# Patient Record
Sex: FEMALE | Race: WHITE | NOT HISPANIC OR LATINO
[De-identification: names, ages, dates, MRNs, and addresses within clinical notes are randomized per-mention and may not be internally consistent; named-entity substitution may affect disease eponyms.]

---

## 2017-07-05 ENCOUNTER — APPOINTMENT (OUTPATIENT)
Dept: GASTROENTEROLOGY | Facility: CLINIC | Age: 67
End: 2017-07-05

## 2017-07-05 VITALS
SYSTOLIC BLOOD PRESSURE: 138 MMHG | TEMPERATURE: 99.2 F | OXYGEN SATURATION: 98 % | BODY MASS INDEX: 22.5 KG/M2 | HEART RATE: 71 BPM | WEIGHT: 127 LBS | HEIGHT: 63 IN | DIASTOLIC BLOOD PRESSURE: 74 MMHG

## 2017-07-05 DIAGNOSIS — I50.9 HEART FAILURE, UNSPECIFIED: ICD-10-CM

## 2017-07-05 DIAGNOSIS — Z78.9 OTHER SPECIFIED HEALTH STATUS: ICD-10-CM

## 2017-07-05 DIAGNOSIS — K57.92 DIVERTICULITIS OF INTESTINE, PART UNSPECIFIED, W/OUT PERFORATION OR ABSCESS W/OUT BLEEDING: ICD-10-CM

## 2017-07-05 RX ORDER — CIPROFLOXACIN HYDROCHLORIDE 500 MG/1
500 TABLET, FILM COATED ORAL
Qty: 20 | Refills: 0 | Status: ACTIVE | COMMUNITY
Start: 2017-06-09

## 2017-07-05 RX ORDER — LOTEPREDNOL ETABONATE 5 MG/ML
0.5 SUSPENSION/ DROPS OPHTHALMIC
Qty: 5 | Refills: 0 | Status: ACTIVE | COMMUNITY
Start: 2017-02-23

## 2017-07-05 RX ORDER — TRIAMCINOLONE ACETONIDE 0.25 MG/G
0.03 CREAM TOPICAL
Qty: 80 | Refills: 0 | Status: ACTIVE | COMMUNITY
Start: 2017-04-20

## 2017-07-05 RX ORDER — LOTEPREDNOL ETABONATE 5 MG/G
0.5 OINTMENT OPHTHALMIC
Qty: 4 | Refills: 0 | Status: ACTIVE | COMMUNITY
Start: 2017-04-28

## 2017-07-05 RX ORDER — CYCLOSPORINE 0.5 MG/ML
0.05 EMULSION OPHTHALMIC
Qty: 6 | Refills: 0 | Status: ACTIVE | COMMUNITY
Start: 2017-03-30

## 2017-07-05 RX ORDER — ERYTHROMYCIN 5 MG/G
5 OINTMENT OPHTHALMIC
Qty: 4 | Refills: 0 | Status: ACTIVE | COMMUNITY
Start: 2016-01-26

## 2017-07-05 RX ORDER — LOTEPREDNOL ETABONATE 5 MG/G
0.5 GEL OPHTHALMIC
Qty: 5 | Refills: 0 | Status: ACTIVE | COMMUNITY
Start: 2017-03-06

## 2017-07-05 RX ORDER — KETOCONAZOLE 20 MG/G
2 CREAM TOPICAL
Qty: 60 | Refills: 0 | Status: ACTIVE | COMMUNITY
Start: 2016-02-23

## 2017-07-05 RX ORDER — HYOSCYAMINE SULFATE 0.12 MG/1
0.12 TABLET ORAL
Qty: 10 | Refills: 0 | Status: ACTIVE | COMMUNITY
Start: 2017-06-09

## 2017-07-05 RX ORDER — BROMFENAC SODIUM 0.7 MG/ML
0.07 SOLUTION/ DROPS OPHTHALMIC
Qty: 3 | Refills: 0 | Status: ACTIVE | COMMUNITY
Start: 2016-04-21

## 2017-07-05 RX ORDER — NYSTATIN 100000 [USP'U]/G
100000 CREAM TOPICAL
Qty: 30 | Refills: 0 | Status: ACTIVE | COMMUNITY
Start: 2017-04-20

## 2017-07-05 RX ORDER — TACROLIMUS 0.3 MG/G
0.03 OINTMENT TOPICAL
Qty: 30 | Refills: 0 | Status: ACTIVE | COMMUNITY
Start: 2016-04-27

## 2017-07-05 RX ORDER — CLINDAMYCIN PHOSPHATE 1 G/10ML
1 GEL TOPICAL
Qty: 60 | Refills: 0 | Status: ACTIVE | COMMUNITY
Start: 2017-05-17

## 2017-07-05 RX ORDER — BEPOTASTINE BESILATE 15 MG/ML
1.5 SOLUTION/ DROPS OPHTHALMIC
Qty: 5 | Refills: 0 | Status: ACTIVE | COMMUNITY
Start: 2016-03-28

## 2017-08-03 ENCOUNTER — APPOINTMENT (OUTPATIENT)
Dept: GASTROENTEROLOGY | Facility: CLINIC | Age: 67
End: 2017-08-03
Payer: MEDICARE

## 2017-08-03 VITALS
DIASTOLIC BLOOD PRESSURE: 70 MMHG | HEIGHT: 63 IN | SYSTOLIC BLOOD PRESSURE: 110 MMHG | WEIGHT: 127 LBS | BODY MASS INDEX: 22.5 KG/M2 | TEMPERATURE: 98.7 F

## 2017-08-03 PROCEDURE — 99213 OFFICE O/P EST LOW 20 MIN: CPT

## 2019-07-10 ENCOUNTER — APPOINTMENT (OUTPATIENT)
Dept: GASTROENTEROLOGY | Facility: CLINIC | Age: 69
End: 2019-07-10
Payer: MEDICARE

## 2019-07-10 VITALS
SYSTOLIC BLOOD PRESSURE: 106 MMHG | BODY MASS INDEX: 20.02 KG/M2 | OXYGEN SATURATION: 98 % | HEART RATE: 89 BPM | WEIGHT: 113 LBS | HEIGHT: 63 IN | TEMPERATURE: 98.9 F | DIASTOLIC BLOOD PRESSURE: 70 MMHG

## 2019-07-10 PROCEDURE — 99214 OFFICE O/P EST MOD 30 MIN: CPT

## 2019-07-10 NOTE — PHYSICAL EXAM
[General Appearance - Alert] : alert [Sclera] : the sclera and conjunctiva were normal [PERRL With Normal Accommodation] : pupils were equal in size, round, and reactive to light [General Appearance - In No Acute Distress] : in no acute distress [Outer Ear] : the ears and nose were normal in appearance [Extraocular Movements] : extraocular movements were intact [Oropharynx] : the oropharynx was normal [Neck Appearance] : the appearance of the neck was normal [Jugular Venous Distention Increased] : there was no jugular-venous distention [Neck Cervical Mass (___cm)] : no neck mass was observed [Thyroid Diffuse Enlargement] : the thyroid was not enlarged [Thyroid Nodule] : there were no palpable thyroid nodules [Auscultation Breath Sounds / Voice Sounds] : lungs were clear to auscultation bilaterally [Heart Rate And Rhythm] : heart rate was normal and rhythm regular [Heart Sounds] : normal S1 and S2 [Heart Sounds Gallop] : no gallops [Murmurs] : no murmurs [Heart Sounds Pericardial Friction Rub] : no pericardial rub [Edema] : there was no peripheral edema [Bowel Sounds] : normal bowel sounds [Abdomen Soft] : soft [Abdomen Tenderness] : non-tender [Abdomen Mass (___ Cm)] : no abdominal mass palpated [No CVA Tenderness] : no ~M costovertebral angle tenderness [No Spinal Tenderness] : no spinal tenderness [Abnormal Walk] : normal gait [Musculoskeletal - Swelling] : no joint swelling seen [Nail Clubbing] : no clubbing  or cyanosis of the fingernails [Motor Tone] : muscle strength and tone were normal [Skin Color & Pigmentation] : normal skin color and pigmentation [] : no rash [Skin Turgor] : normal skin turgor [Deep Tendon Reflexes (DTR)] : deep tendon reflexes were 2+ and symmetric [Sensation] : the sensory exam was normal to light touch and pinprick [No Focal Deficits] : no focal deficits [Oriented To Time, Place, And Person] : oriented to person, place, and time [Impaired Insight] : insight and judgment were intact [Affect] : the affect was normal

## 2019-07-10 NOTE — HISTORY OF PRESENT ILLNESS
[FreeTextEntry1] : pt w recent dx of c diff  Patient had been on a course of antibiotics for an upper respiratory infection and developed watery diarrhea. Patient went to [facility] ambulatory and stool cultures showed C. difficile. Patient was started on vancomycin and received 2 doses of 125 mg and is feeling better with formed stool. Patient came in for further discussion. There is no associated nausea vomiting fever chills rectal bleeding or melena.

## 2019-07-10 NOTE — ASSESSMENT
[FreeTextEntry1] : Impression and plan\par \par Patient presents to the office today with recent testing demonstrating C. difficile toxin. Patient has been having some loose stools with some mucus but after 2 doses of antibiotics she is in fact feeling a bit better. I will recommend patient continue on treatment for a ten-day course and get back to me mid treatment and give me an update regarding her status. Patient will followup as necessary

## 2019-07-11 RX ORDER — VANCOMYCIN HYDROCHLORIDE 250 MG/1
250 CAPSULE ORAL 4 TIMES DAILY
Qty: 40 | Refills: 2 | Status: ACTIVE | COMMUNITY
Start: 2019-07-11 | End: 1900-01-01

## 2019-07-17 ENCOUNTER — CLINICAL ADVICE (OUTPATIENT)
Age: 69
End: 2019-07-17

## 2019-08-01 ENCOUNTER — APPOINTMENT (OUTPATIENT)
Dept: GASTROENTEROLOGY | Facility: CLINIC | Age: 69
End: 2019-08-01
Payer: MEDICARE

## 2019-08-01 VITALS
SYSTOLIC BLOOD PRESSURE: 110 MMHG | WEIGHT: 117 LBS | BODY MASS INDEX: 20.73 KG/M2 | HEART RATE: 88 BPM | OXYGEN SATURATION: 98 % | TEMPERATURE: 98.3 F | HEIGHT: 63 IN | DIASTOLIC BLOOD PRESSURE: 70 MMHG

## 2019-08-01 PROCEDURE — 99212 OFFICE O/P EST SF 10 MIN: CPT

## 2019-08-01 NOTE — HISTORY OF PRESENT ILLNESS
[FreeTextEntry1] : Patient came to the office today with complaints of perianal skin rash. She states that she had some erythema and discomfort in this area.

## 2019-08-01 NOTE — PHYSICAL EXAM
[General Appearance - Alert] : alert [General Appearance - In No Acute Distress] : in no acute distress [PERRL With Normal Accommodation] : pupils were equal in size, round, and reactive to light [Sclera] : the sclera and conjunctiva were normal [Extraocular Movements] : extraocular movements were intact [Oropharynx] : the oropharynx was normal [Outer Ear] : the ears and nose were normal in appearance [Neck Cervical Mass (___cm)] : no neck mass was observed [Neck Appearance] : the appearance of the neck was normal [Thyroid Nodule] : there were no palpable thyroid nodules [Jugular Venous Distention Increased] : there was no jugular-venous distention [Thyroid Diffuse Enlargement] : the thyroid was not enlarged [Auscultation Breath Sounds / Voice Sounds] : lungs were clear to auscultation bilaterally [Heart Rate And Rhythm] : heart rate was normal and rhythm regular [Murmurs] : no murmurs [Heart Sounds Gallop] : no gallops [Heart Sounds] : normal S1 and S2 [Heart Sounds Pericardial Friction Rub] : no pericardial rub [Edema] : there was no peripheral edema [Abdomen Soft] : soft [Bowel Sounds] : normal bowel sounds [Abdomen Tenderness] : non-tender [Abdomen Mass (___ Cm)] : no abdominal mass palpated [No CVA Tenderness] : no ~M costovertebral angle tenderness [FreeTextEntry1] : The perianal skin was erythematous approximately 3 cm extending from the anal verge in a circular pattern. [No Spinal Tenderness] : no spinal tenderness [Abnormal Walk] : normal gait [Nail Clubbing] : no clubbing  or cyanosis of the fingernails [Motor Tone] : muscle strength and tone were normal [Musculoskeletal - Swelling] : no joint swelling seen [Skin Color & Pigmentation] : normal skin color and pigmentation [Skin Turgor] : normal skin turgor [] : no rash [Deep Tendon Reflexes (DTR)] : deep tendon reflexes were 2+ and symmetric [No Focal Deficits] : no focal deficits [Sensation] : the sensory exam was normal to light touch and pinprick [Impaired Insight] : insight and judgment were intact [Oriented To Time, Place, And Person] : oriented to person, place, and time [Affect] : the affect was normal

## 2019-08-01 NOTE — ASSESSMENT
[FreeTextEntry1] : Impression and plan\par \par Patient has perianal irritation possibly related to recent antibiotics perhaps a superficial fungal infection. I will recommend combination of Lotrisone cream and hydrocortisone. Patient is already applied cortisone with partial relief. We spoke about perianal hygiene dry clean avoidance of trauma etc. Patient will keep me updated.

## 2019-10-08 ENCOUNTER — TRANSCRIPTION ENCOUNTER (OUTPATIENT)
Age: 69
End: 2019-10-08

## 2019-10-08 ENCOUNTER — APPOINTMENT (OUTPATIENT)
Dept: GASTROENTEROLOGY | Facility: CLINIC | Age: 69
End: 2019-10-08
Payer: MEDICARE

## 2019-10-08 VITALS
WEIGHT: 114 LBS | HEIGHT: 63 IN | HEART RATE: 75 BPM | TEMPERATURE: 98.5 F | BODY MASS INDEX: 20.2 KG/M2 | DIASTOLIC BLOOD PRESSURE: 80 MMHG | SYSTOLIC BLOOD PRESSURE: 130 MMHG | OXYGEN SATURATION: 97 %

## 2019-10-08 PROCEDURE — 99214 OFFICE O/P EST MOD 30 MIN: CPT

## 2019-10-08 RX ORDER — MINOXIDIL 2 %
2 SOLUTION, NON-ORAL TOPICAL
Refills: 0 | Status: ACTIVE | COMMUNITY

## 2019-10-08 NOTE — HISTORY OF PRESENT ILLNESS
[FreeTextEntry1] : Patient presents to the office today to discuss ongoing GI complaints. The patient was last seen about a month ago. In the interim she has been feeling fairly well but has been experiencing some hair loss which she is possibly true recent use of vancomycin versus possible low thyroid. Blood work had demonstrated elevated TSH. Patient has been taking iron supplements along with vitamin D. and B12. She has been having hard dark stools which are difficult to pass. A stool test was sent and this had demonstrated possible positive C. difficile.? Patient denies diarrhea nausea vomiting fever chills or other symptomatology.

## 2019-10-08 NOTE — ASSESSMENT
[FreeTextEntry1] : Impression and plan\par \par Patient presents to the office today with complaints of recent hair loss dark stool possibly related to iron supplementation and a positive C. difficile titer despite having hard formed stools without diarrhea or other symptoms. At this time I will recommend repeat stool testing and decide regarding antibiotic therapy based upon this. Patient states sensitivity to vancomycin hence we will have to try an alternative perhaps deficid  patient will call back in a few days regarding results she'll discontinue iron\par Supplementation and add  fiber to her diet and perhaps small doses of MiraLax  to reregulate her constipated hard stools. I will advise accordingly.

## 2019-10-10 LAB
C DIFF TOX GENS STL QL NAA+PROBE: NORMAL
CDIFF BY PCR: DETECTED

## 2019-10-10 RX ORDER — FIDAXOMICIN 200 MG/1
200 TABLET, FILM COATED ORAL TWICE DAILY
Qty: 20 | Refills: 0 | Status: ACTIVE | COMMUNITY
Start: 2019-10-10 | End: 1900-01-01

## 2019-10-14 ENCOUNTER — APPOINTMENT (OUTPATIENT)
Dept: GASTROENTEROLOGY | Facility: CLINIC | Age: 69
End: 2019-10-14
Payer: MEDICARE

## 2019-10-14 VITALS
WEIGHT: 113 LBS | DIASTOLIC BLOOD PRESSURE: 70 MMHG | HEIGHT: 63 IN | BODY MASS INDEX: 20.02 KG/M2 | SYSTOLIC BLOOD PRESSURE: 100 MMHG | TEMPERATURE: 98.1 F | HEART RATE: 74 BPM | OXYGEN SATURATION: 98 %

## 2019-10-14 DIAGNOSIS — A04.72 ENTEROCOLITIS DUE TO CLOSTRIDIUM DIFFICILE, NOT SPECIFIED AS RECURRENT: ICD-10-CM

## 2019-10-14 DIAGNOSIS — L29.0 PRURITUS ANI: ICD-10-CM

## 2019-10-14 PROCEDURE — 99213 OFFICE O/P EST LOW 20 MIN: CPT

## 2019-10-14 NOTE — ASSESSMENT
[FreeTextEntry1] : Impression and plan\par Perianal itching of uncertain causation recommend patient continue on antibiotics for C. difficile for a full ten-day course and maintain topical treatment with hydrocortisone cream and Lotrisone externally. Patient was told not to strain irritate or overhydrate the area.

## 2019-10-14 NOTE — HISTORY OF PRESENT ILLNESS
[FreeTextEntry1] : Patient came to the office today as add on visit.  Patient has been complaining about perirectal itching. I told her to come in. She was examined and noted to have no external rash. Anal canal was nontender. She has been applying Lotrisone and cortisone cream.

## 2019-10-14 NOTE — PHYSICAL EXAM
[General Appearance - Alert] : alert [General Appearance - In No Acute Distress] : in no acute distress [Sclera] : the sclera and conjunctiva were normal [PERRL With Normal Accommodation] : pupils were equal in size, round, and reactive to light [Extraocular Movements] : extraocular movements were intact [Outer Ear] : the ears and nose were normal in appearance [Oropharynx] : the oropharynx was normal [Neck Cervical Mass (___cm)] : no neck mass was observed [Neck Appearance] : the appearance of the neck was normal [Jugular Venous Distention Increased] : there was no jugular-venous distention [Thyroid Diffuse Enlargement] : the thyroid was not enlarged [Thyroid Nodule] : there were no palpable thyroid nodules [Auscultation Breath Sounds / Voice Sounds] : lungs were clear to auscultation bilaterally [Heart Sounds] : normal S1 and S2 [Heart Rate And Rhythm] : heart rate was normal and rhythm regular [Heart Sounds Gallop] : no gallops [Murmurs] : no murmurs [Heart Sounds Pericardial Friction Rub] : no pericardial rub [Edema] : there was no peripheral edema [Bowel Sounds] : normal bowel sounds [Abdomen Soft] : soft [Abdomen Tenderness] : non-tender [Abdomen Mass (___ Cm)] : no abdominal mass palpated [FreeTextEntry1] : The perianal skin was erythematous approximately 3 cm extending from the anal verge in a circular pattern. [No CVA Tenderness] : no ~M costovertebral angle tenderness [No Spinal Tenderness] : no spinal tenderness [Abnormal Walk] : normal gait [Nail Clubbing] : no clubbing  or cyanosis of the fingernails [Musculoskeletal - Swelling] : no joint swelling seen [Motor Tone] : muscle strength and tone were normal [Skin Color & Pigmentation] : normal skin color and pigmentation [Skin Turgor] : normal skin turgor [] : no rash [Deep Tendon Reflexes (DTR)] : deep tendon reflexes were 2+ and symmetric [Sensation] : the sensory exam was normal to light touch and pinprick [No Focal Deficits] : no focal deficits [Oriented To Time, Place, And Person] : oriented to person, place, and time [Impaired Insight] : insight and judgment were intact [Affect] : the affect was normal

## 2019-10-30 LAB
C DIFF TOX GENS STL QL NAA+PROBE: NORMAL
CDIFF BY PCR: NOT DETECTED

## 2020-05-15 DIAGNOSIS — R19.7 DIARRHEA, UNSPECIFIED: ICD-10-CM

## 2020-05-19 ENCOUNTER — APPOINTMENT (OUTPATIENT)
Dept: GASTROENTEROLOGY | Facility: CLINIC | Age: 70
End: 2020-05-19
Payer: MEDICARE

## 2020-05-19 VITALS
HEIGHT: 63 IN | TEMPERATURE: 98.6 F | OXYGEN SATURATION: 95 % | SYSTOLIC BLOOD PRESSURE: 120 MMHG | BODY MASS INDEX: 20.2 KG/M2 | HEART RATE: 67 BPM | WEIGHT: 114 LBS | DIASTOLIC BLOOD PRESSURE: 80 MMHG

## 2020-05-19 DIAGNOSIS — Z88.9 ALLERGY STATUS TO UNSPECIFIED DRUGS, MEDICAMENTS AND BIOLOGICAL SUBSTANCES: ICD-10-CM

## 2020-05-19 DIAGNOSIS — Z86.39 PERSONAL HISTORY OF OTHER ENDOCRINE, NUTRITIONAL AND METABOLIC DISEASE: ICD-10-CM

## 2020-05-19 DIAGNOSIS — R10.9 UNSPECIFIED ABDOMINAL PAIN: ICD-10-CM

## 2020-05-19 DIAGNOSIS — Z83.3 FAMILY HISTORY OF DIABETES MELLITUS: ICD-10-CM

## 2020-05-19 DIAGNOSIS — Z80.42 FAMILY HISTORY OF MALIGNANT NEOPLASM OF PROSTATE: ICD-10-CM

## 2020-05-19 PROCEDURE — 99214 OFFICE O/P EST MOD 30 MIN: CPT

## 2020-05-19 NOTE — HISTORY OF PRESENT ILLNESS
[FreeTextEntry1] : Patient came to the office today for evaluation of recent excess flatus. She had called last week and requested stool testing for C. difficile which was fortunately negative. The patient states that she is feeling better at this point but has been experiencing excess gas and flatus. She thinks it may be diet related. Patient currently denies nausea vomiting fever chills rectal bleeding or melena. She kept her visit for discussion purposes.

## 2020-05-19 NOTE — ASSESSMENT
[FreeTextEntry1] : Impression and plan\par \par Patient came to the office today to discuss recent GI symptomatology which has fortunately improved since making her appointment. Patient had excess flatus and gas and bloating possibly related to dietary items. At this time patient is doing well and her recent stool testing was negative for C. difficile.It has been at least 6 years since her last colonoscopy and I will suggest that she call and schedule at her convenience. The risks benefits alternatives and limitations were discussed.

## 2020-05-20 LAB
C DIFF TOX GENS STL QL NAA+PROBE: NORMAL
CDIFF BY PCR: NOT DETECTED
GI PCR PANEL, STOOL: NORMAL

## 2022-07-01 ENCOUNTER — APPOINTMENT (OUTPATIENT)
Dept: ORTHOPEDIC SURGERY | Facility: CLINIC | Age: 72
End: 2022-07-01

## 2022-07-01 VITALS — BODY MASS INDEX: 20.2 KG/M2 | HEIGHT: 63 IN | WEIGHT: 114 LBS

## 2022-07-01 DIAGNOSIS — S92.352A DISPLACED FRACTURE OF FIFTH METATARSAL BONE, LEFT FOOT, INITIAL ENCOUNTER FOR CLOSED FRACTURE: ICD-10-CM

## 2022-07-01 PROCEDURE — 99204 OFFICE O/P NEW MOD 45 MIN: CPT

## 2022-07-01 PROCEDURE — L4361: CPT

## 2022-07-01 NOTE — HISTORY OF PRESENT ILLNESS
[Dull/Aching] : dull/aching [Localized] : localized [Sharp] : sharp [Intermittent] : intermittent [Standing] : standing [Walking] : walking [Stairs] : stairs [Retired] : Work status: retired [de-identified] : 7/1/22: 72 yo female with left foot pain since 7/1/22. She reports slipping in her living room. She had pain in her foot. [facility] and was splitned.  [] : Post Surgical Visit: no [FreeTextEntry1] : Left foot [FreeTextEntry3] : 7/1/22 [FreeTextEntry5] : patient tripped and fell down in her living room at home on 7/1/22\par

## 2022-07-01 NOTE — PHYSICAL EXAM
[Left] : left foot and ankle [Moderate] : moderate swelling of lateral foot [5th] : 5th [2+] : dorsalis pedis pulse: 2+ [] : no medial malleolus tenderness

## 2022-07-01 NOTE — DATA REVIEWED
[Outside X-rays] : outside x-rays [Left] : left [I independently reviewed and interpreted images and report] : I independently reviewed and interpreted images and report [FreeTextEntry1] : base 5th met fx

## 2022-07-13 ENCOUNTER — APPOINTMENT (OUTPATIENT)
Dept: ORTHOPEDIC SURGERY | Facility: CLINIC | Age: 72
End: 2022-07-13

## 2023-11-30 ENCOUNTER — APPOINTMENT (OUTPATIENT)
Dept: GASTROENTEROLOGY | Facility: CLINIC | Age: 73
End: 2023-11-30
Payer: MEDICARE

## 2023-11-30 VITALS
OXYGEN SATURATION: 97 % | DIASTOLIC BLOOD PRESSURE: 80 MMHG | BODY MASS INDEX: 20.73 KG/M2 | HEIGHT: 63 IN | HEART RATE: 66 BPM | SYSTOLIC BLOOD PRESSURE: 116 MMHG | WEIGHT: 117 LBS | TEMPERATURE: 96 F

## 2023-11-30 DIAGNOSIS — K59.09 OTHER CONSTIPATION: ICD-10-CM

## 2023-11-30 PROCEDURE — 99204 OFFICE O/P NEW MOD 45 MIN: CPT

## 2023-11-30 RX ORDER — POLYETHYLENE GLYCOL-3350 AND ELECTROLYTES 236; 6.74; 5.86; 2.97; 22.74 G/274.31G; G/274.31G; G/274.31G; G/274.31G; G/274.31G
236 POWDER, FOR SOLUTION ORAL
Qty: 1 | Refills: 0 | Status: ACTIVE | COMMUNITY
Start: 2023-11-30 | End: 1900-01-01

## 2023-11-30 RX ORDER — LEVOTHYROXINE SODIUM 25 UG/1
25 CAPSULE ORAL
Refills: 0 | Status: ACTIVE | COMMUNITY

## 2023-11-30 RX ORDER — DOCUSATE SODIUM 100 MG/1
CAPSULE ORAL
Refills: 0 | Status: ACTIVE | COMMUNITY

## 2023-11-30 RX ORDER — HYDROCORTISONE 25 MG/G
2.5 CREAM TOPICAL DAILY
Qty: 1 | Refills: 10 | Status: ACTIVE | COMMUNITY
Start: 2023-11-30 | End: 1900-01-01

## 2023-11-30 RX ORDER — CYCLOSPORINE 0.5 MG/ML
EMULSION OPHTHALMIC
Refills: 0 | Status: ACTIVE | COMMUNITY

## 2023-11-30 RX ORDER — CETIRIZINE HYDROCHLORIDE 10 MG/1
CAPSULE, LIQUID FILLED ORAL
Refills: 0 | Status: ACTIVE | COMMUNITY

## 2024-02-21 ENCOUNTER — APPOINTMENT (OUTPATIENT)
Dept: GASTROENTEROLOGY | Facility: AMBULATORY MEDICAL SERVICES | Age: 74
End: 2024-02-21